# Patient Record
Sex: MALE | Race: WHITE | NOT HISPANIC OR LATINO | Employment: FULL TIME | URBAN - METROPOLITAN AREA
[De-identification: names, ages, dates, MRNs, and addresses within clinical notes are randomized per-mention and may not be internally consistent; named-entity substitution may affect disease eponyms.]

---

## 2023-09-05 ENCOUNTER — HOSPITAL ENCOUNTER (EMERGENCY)
Facility: CLINIC | Age: 28
Discharge: HOME | End: 2023-09-05
Attending: EMERGENCY MEDICINE | Admitting: EMERGENCY MEDICINE
Payer: COMMERCIAL

## 2023-09-05 VITALS
RESPIRATION RATE: 16 BRPM | TEMPERATURE: 97.8 F | HEART RATE: 82 BPM | OXYGEN SATURATION: 97 % | DIASTOLIC BLOOD PRESSURE: 90 MMHG | SYSTOLIC BLOOD PRESSURE: 144 MMHG

## 2023-09-05 DIAGNOSIS — R13.10 SWALLOWING PROBLEM: ICD-10-CM

## 2023-09-05 PROCEDURE — 99283 EMERGENCY DEPT VISIT LOW MDM: CPT

## 2023-09-05 PROCEDURE — 250N000013 HC RX MED GY IP 250 OP 250 PS 637: Performed by: STUDENT IN AN ORGANIZED HEALTH CARE EDUCATION/TRAINING PROGRAM

## 2023-09-05 RX ADMIN — ANTACID/ANTIFLATULENT 4 G: 380; 550; 10; 10 GRANULE, EFFERVESCENT ORAL at 22:55

## 2023-09-05 ASSESSMENT — ACTIVITIES OF DAILY LIVING (ADL): ADLS_ACUITY_SCORE: 35

## 2023-09-06 NOTE — DISCHARGE INSTRUCTIONS
Return to the ED should you develop worsening pain, difficulty swallowing, difficulty breathing, or any further concerns.     Continue drinking some water or other drinks this evening if you have any recurrent symptoms in your throat.  This will help flush the piece of corn out if it still stuck in the upper airway.  You have likely already resolved it.    Return if there is fevers, chills, sweats, or increasing cough.  Return if there is further bleeding with cough or vomiting.

## 2023-09-06 NOTE — ED PROVIDER NOTES
"  History     Chief Complaint:  Swallowed Foreign Body       HPI   Marshal Hope is a 27 year old male who presents to the emergency department with concerns of a piece of corn being stuck in his throat.  Around 830, he was eating Ramen when he accidentally \"inhaled\" a piece of corn. He the aggressively coughed. He is now felt it in his throat on the right side intermittently.  He made himself throw up at home and was able to vomit but still has the foreign body sensation in his throat.  He noticed a dot of blood in the emesis which caused concern. This has never happened before.  He does not feel short of breath but feels his voice might sound little different.  He feels like the object is moving when he coughs.    Independent Historian:   None    Review of External Notes:   None    Medications:    No current outpatient medications on file.      Past Medical History:    No past medical history on file.    Past Surgical History:    No past surgical history on file.     Physical Exam   Patient Vitals for the past 24 hrs:   BP Temp Pulse Resp SpO2   09/05/23 2347 (!) 144/90 -- 82 16 97 %   09/05/23 2152 (!) 156/87 97.8  F (36.6  C) 101 16 98 %   Vital signs are normal    Physical Exam  Vital signs and nursing notes reviewed.    General:  Alert and oriented, no acute distress. Resting on bed with spouse at bedside.   Skin: Skin is warm and dry. No diaphoresis.  HEENT:   Head: Normocephalic, atraumatic. Facial features symmetric.   Eyes: Conjunctiva pink, sclera white. EOMs grossly intact.   Ears: Auricles without lesion, erythema, or edema.   Nose: Symmetric with no discharge.  Mouth and throat: Lips are moist with no lesions or edema.Oropharynx is without foreign body visualized.  Right tonsil is mildly larger than the left.  No petechiae or exudates.  Tongue is moist and pink without exudates or lesions.  Able to visualize fairly deep into the hypopharynx and there is no obvious abnormality.  No pain with " movement of the trachea.  Neck: Normal range of motion. Neck supple with no lymphadenopathy. No tracheal deviation.   CV:  Heart RRR with no murmurs or extra heart sounds.   Pulm/Chest: Chest wall expansion symmetric with no increased effort of breathing. Lungs clear and equal to auscultation bilaterally.   M/S: Moves all extremities spontaneously.  Psych: Normal mood and affect. Behavior is normal.     Emergency Department Course     Imaging:  No orders to display      Laboratory:  Labs Ordered and Resulted from Time of ED Arrival to Time of ED Departure - No data to display     Procedures   None    Emergency Department Course & Assessments:             Interventions:  Medications   sod bicarbonate-citric acid-simethicone (EZ GAS) 2.21-1.53-0.04 g packet 4 g (has no administration in time range)        Independent Interpretation (X-rays, CTs, rhythm strip):  None    Consultations/Discussion of Management or Tests/  Assessments:     ED Course as of 09/06/23 0018   Tue Sep 05, 2023   2245 I initially assessed the patient and obtained the above history and physical exam.     2310 I reassessed the patient.  He feels improved after the EZ gas.  He did not vomit, but feels that his voice is more clear and he has no trouble breathing.   2338 Dr. Menon assessed the patient.       Social Determinants of Health affecting care:   None    Disposition:  The patient was discharged to home.     Impression & Plan    CMS Diagnoses: None    Medical Decision Making:  Marshal Hope is a 27 year old male who presents for concerns of aspirating a piece of corn with a coughing fit and foreign body sensation in his esophagus.  See HPI.  On exam, patient is well-appearing.  We are able to visualize fairly deep into his the patient is hypopharynx and there is no obvious foreign body or abnormality.  It is possible however that the piece of corn may have become lodged in the piriform sinus.  He may have aspirated some of the soup broth  or corn, but the patient had aggressive coughing after the episode and is no longer having any difficulty.  He likely would have coughed this back up or swallowed it down his esophagus.  We did trial some EZ gas here in the emergency department, which he felt helped the foreign body sensation and resolved his symptoms.  The patient's airway is clear and he is managing his secretions.  He is tolerating p.o. intake with water and feeling improved.  Using reasonable clinical judgment, the patient is safe for discharge home at this time but is instructed to return to the emergency department should he have any further issues or concerns.  The patient and his spouse are comfortable with this plan and agreeable.  The other questions answered.    I staffed this patient with Dr. Menon who agrees with the above assessment and plan.    Critical Care time:  was 0 minutes for this patient excluding procedures.    Diagnosis:    ICD-10-CM    1. Swallowing problem  R13.10     resolved           Discharge Medications:  New Prescriptions    No medications on file        Hanh Jaimes PA-C  9/5/2023          Hanh Jaimes PA-C  09/06/23 0224

## 2023-09-06 NOTE — ED PROVIDER NOTES
Emergency Department Attending Supervision Note    9/5/2023  11:48 PM      I evaluated this patient in conjunction with Hanh Jaimes PA-C    Briefly, the patient presented with a coughing spell after eating a piece of corn that was inside a bowl of Ramen soup.  The patient was slipping the soup in at the time and then he felt something go into the hypopharynx or potentially the lung and started coughing.  He localized the possible piece of corn or broth to the right piriform recess region.  He was actively and aggressively coughing.  He then noted a dot of blood which caused concern.  Patient was worried that maybe he aspirated something or that something became stuck.    My exam:  General: Resting comfortably on the gurney  Head:  The scalp, face, and head appear normal  Eyes:  The pupils are normal    Conjunctivae and sclera appear normal  ENT:    The nose is normal    Ears/pinnae are normal    The oropharynx does not demonstrate any significant foreign body that I can see.  The right tonsil is cryptic and larger than the left tonsil.  He has no chronic pharyngeal complaints or frequent episodes of strep throat.  There is no exudates.  The tongue is normal.  I can visualize fairly deep into the hypopharynx and there is no obvious abnormality.  I cannot appreciate the epiglottis or the piriform recesses based on visual examination which is not unexpected.  There is no tenderness over the trachea.  There is no pain with movement of the trachea or glottic structures.  Neck:  Normal range of motion  Lungs: The lungs are clear  Heart:  Regular rate, normal rhythm, no murmur  Skin:  No rash or acute lesions noted.  Neuro:  Speech is normal and fluent.  No focal deficits.  Psych: Awake. Alert.  Normal affect.  Appropriate interactions        Medical Decision Making:  This patient presents to the emergency department with an episode of coughing and possible microaspiration of a piece of corn.  It is possible that the  patient simply swallowed the corn down the esophagus but I believe it either initially became lodged in the right piriform sinus/recess or he possibly aspirated some broth or corn into the lungs.  The patient was forcibly coughing and is no longer having any difficulty.  He did get some easy gas here to cause some additional fizzing in the hypopharynx and in the esophagus in case there was anything that was obstructing and interestingly he felt that this further helped and resolve the symptoms.  It is doubtful that it helped clear an esophageal foreign body but it may help to clear a piriform recess foreign body.  I told the patient if he did aspirate down into the subglottic area into the trachea or bronchi he must of cough things out by now as his he has no further cough at this time.  He is feeling better and is prepared for discharge at this time.  I did explain to him and his spouse what they should look out for and what they could do if there are recurrent symptoms.  We are not going to proceed with fiberoptic laryngoscopy at this time since the patient's symptoms have completely resolved.    My impression/diagnosis is:    Coughing spell likely secondary to transient aspiration.  This is unlikely to represent esophageal foreign body.  A transiently retained foreign body in the piriform recess could have a similar presentation but symptoms have now cleared.          Santi Menon MD, [facility], [facility]  Emergency Physician       Santi Menon MD  09/05/23 1349

## 2023-09-06 NOTE — ED TRIAGE NOTES
Pt reports feeling a piece of corn stuck in throat while eating ramen this evening. Denies sob, speaking in full clear complete sentences     Triage Assessment       Row Name 09/05/23 [id number]       Respiratory WDL    Respiratory WDL WDL       Cardiac WDL    Cardiac WDL WDL       Cognitive/Neuro/Behavioral WDL    Cognitive/Neuro/Behavioral WDL WDL

## 2024-10-14 ENCOUNTER — TELEPHONE (OUTPATIENT)
Dept: DERMATOLOGY | Facility: CLINIC | Age: 29
End: 2024-10-14
Payer: COMMERCIAL

## 2024-10-14 NOTE — TELEPHONE ENCOUNTER
[facility] Call Center    Phone Message      Reason for Call: Appointment Intake      Referring Provider Name:   Self Referred (online Appt request)    Diagnosis and/or Symptoms:   Pt said :  I have a mass growing on my left forearm. It started as a small lesion, thought it might be an ingrown hair or severe pimple. At times it's been puss-filled. Started about 6 months ago, and it's grown a lot in the last week.         Action Taken: Other: none - noting that I called Pt from online Appt request    Travel Screening: Not Applicable